# Patient Record
Sex: FEMALE | Race: WHITE | Employment: FULL TIME | URBAN - METROPOLITAN AREA
[De-identification: names, ages, dates, MRNs, and addresses within clinical notes are randomized per-mention and may not be internally consistent; named-entity substitution may affect disease eponyms.]

---

## 2017-03-17 ENCOUNTER — HOSPITAL ENCOUNTER (OUTPATIENT)
Dept: MAMMOGRAPHY | Age: 42
Discharge: HOME | End: 2017-03-17
Attending: OBSTETRICS & GYNECOLOGY
Payer: COMMERCIAL

## 2017-03-17 DIAGNOSIS — Z12.31 VISIT FOR SCREENING MAMMOGRAM: ICD-10-CM

## 2017-03-17 PROCEDURE — 77067 SCR MAMMO BI INCL CAD: CPT

## 2017-07-11 ENCOUNTER — OFFICE VISIT (OUTPATIENT)
Dept: INTERNAL MEDICINE CLINIC | Age: 42
End: 2017-07-11

## 2017-07-11 VITALS
WEIGHT: 136.2 LBS | RESPIRATION RATE: 18 BRPM | DIASTOLIC BLOOD PRESSURE: 80 MMHG | TEMPERATURE: 98 F | OXYGEN SATURATION: 98 % | HEIGHT: 62 IN | SYSTOLIC BLOOD PRESSURE: 100 MMHG | BODY MASS INDEX: 25.06 KG/M2 | HEART RATE: 100 BPM

## 2017-07-11 DIAGNOSIS — R14.0 ABDOMINAL BLOATING: ICD-10-CM

## 2017-07-11 DIAGNOSIS — R11.0 NAUSEA: Primary | ICD-10-CM

## 2017-07-11 RX ORDER — OMEPRAZOLE 20 MG/1
20 CAPSULE, DELAYED RELEASE ORAL DAILY
Qty: 21 CAP | Refills: 0
Start: 2017-07-11 | End: 2017-08-01

## 2017-07-11 NOTE — MR AVS SNAPSHOT
Visit Information Date & Time Provider Department Dept. Phone Encounter #  
 7/11/2017  3:00 PM Cindy Boone, [address] [facility] [phone] [id number] Follow-up Instructions Return in about 3 weeks (around 8/1/2017). Upcoming Health Maintenance Date Due INFLUENZA AGE 9 TO ADULT 8/1/2017 PAP AKA CERVICAL CYTOLOGY 1/1/2020 DTaP/Tdap/Td series (2 - Td) 4/18/2022 Allergies as of 7/11/2017  Review Complete On: 7/11/2017 By: Cindy Boone MD  
 No Known Allergies Current Immunizations  Reviewed on 5/8/2013 Name Date Influenza Vaccine 10/8/2012 Tdap 4/18/2012 Not reviewed this visit You Were Diagnosed With   
  
 Codes Comments Nausea    -  Primary ICD-10-CM: R11.0 ICD-9-CM: 787.02 Abdominal bloating     ICD-10-CM: R14.0 ICD-9-CM: 884. 3 Vitals BP Pulse Temp Resp Height(growth percentile) Weight(growth percentile) 100/80 (BP 1 Location: Right arm, BP Patient Position: Sitting) 100 98 °F (36.7 °C) (Oral) 18 5' 1.5\" (1.562 m) 136 lb 3.2 oz (61.8 kg) LMP SpO2 BMI OB Status Smoking Status 07/10/2017 (Exact Date) 98% 25.32 kg/m2 Having regular periods Never Smoker BMI and BSA Data Body Mass Index Body Surface Area  
 25.32 kg/m 2 1.64 m 2 Preferred Pharmacy Pharmacy Name Phone [facility]/PH[facility]AC[facility] #[id number] MARLENA Franks  [phone] Your Updated Medication List  
  
   
This list is accurate as of: 7/11/17  4:03 PM.  Always use your most recent med list.  
  
  
  
  
 levonorgestrel-ethinyl estradiol 0.15-0.03 mg Tab Commonly known as:  NORDETTE Take  by mouth daily. omeprazole 20 mg capsule Commonly known as:  PRILOSEC Take 1 Cap by mouth daily for 21 days. TOPAMAX 50 mg tablet Generic drug:  topiramate Take 175 mg by mouth two (2) times a day. We Performed the Following CBC WITH AUTOMATED DIFF [40854 CPT(R)] METABOLIC PANEL, COMPREHENSIVE [18918 CPT(R)] TSH 3RD GENERATION [48038 CPT(R)] URINALYSIS W/ RFLX MICROSCOPIC [76409 CPT(R)] Follow-up Instructions Return in about 3 weeks (around 8/1/2017). To-Do List   
 07/13/2017 Imaging:  US ABD COMP Introducing [facility] & HEALTH SERVICES! Dear [facility]: 
Thank you for requesting a [facility] account. Our records indicate that you already have an active [facility] account. You can access your account anytime at [email or web address] Did you know that you can access your hospital and ER discharge instructions at any time in [facility]? You can also review all of your test results from your hospital stay or ER visit. Additional Information If you have questions, please visit the Frequently Asked Questions section of the [facility] website at [email or web address] Remember, [facility] is NOT to be used for urgent needs. For medical emergencies, dial 911. Now available from your iPhone and Android! Please provide this summary of care documentation to your next provider. Your primary care clinician is listed as Margi Cornejo. If you have any questions after today's visit, please call [phone].

## 2017-07-12 LAB
ALBUMIN SERPL-MCNC: 4.3 G/DL (ref 3.5–5.5)
ALBUMIN/GLOB SERPL: 1.7 {RATIO} (ref 1.2–2.2)
ALP SERPL-CCNC: 55 IU/L (ref 39–117)
ALT SERPL-CCNC: 10 IU/L (ref 0–32)
APPEARANCE UR: ABNORMAL
AST SERPL-CCNC: 15 IU/L (ref 0–40)
BASOPHILS # BLD AUTO: 0 X10E3/UL (ref 0–0.2)
BASOPHILS NFR BLD AUTO: 0 %
BILIRUB SERPL-MCNC: 0.2 MG/DL (ref 0–1.2)
BILIRUB UR QL STRIP: NEGATIVE
BUN SERPL-MCNC: 11 MG/DL (ref 6–24)
BUN/CREAT SERPL: 15 (ref 9–23)
CALCIUM SERPL-MCNC: 9.1 MG/DL (ref 8.7–10.2)
CHLORIDE SERPL-SCNC: 103 MMOL/L (ref 96–106)
CO2 SERPL-SCNC: 23 MMOL/L (ref 18–29)
COLOR UR: YELLOW
CREAT SERPL-MCNC: 0.74 MG/DL (ref 0.57–1)
EOSINOPHIL # BLD AUTO: 0.1 X10E3/UL (ref 0–0.4)
EOSINOPHIL NFR BLD AUTO: 1 %
ERYTHROCYTE [DISTWIDTH] IN BLOOD BY AUTOMATED COUNT: 13.5 % (ref 12.3–15.4)
GLOBULIN SER CALC-MCNC: 2.5 G/DL (ref 1.5–4.5)
GLUCOSE SERPL-MCNC: 80 MG/DL (ref 65–99)
GLUCOSE UR QL: NEGATIVE
HCT VFR BLD AUTO: 40.5 % (ref 34–46.6)
HGB BLD-MCNC: 13.5 G/DL (ref 11.1–15.9)
HGB UR QL STRIP: NEGATIVE
IMM GRANULOCYTES # BLD: 0 X10E3/UL (ref 0–0.1)
IMM GRANULOCYTES NFR BLD: 0 %
KETONES UR QL STRIP: NEGATIVE
LEUKOCYTE ESTERASE UR QL STRIP: NEGATIVE
LYMPHOCYTES # BLD AUTO: 2.3 X10E3/UL (ref 0.7–3.1)
LYMPHOCYTES NFR BLD AUTO: 34 %
MCH RBC QN AUTO: 31.7 PG (ref 26.6–33)
MCHC RBC AUTO-ENTMCNC: 33.3 G/DL (ref 31.5–35.7)
MCV RBC AUTO: 95 FL (ref 79–97)
MICRO URNS: ABNORMAL
MONOCYTES # BLD AUTO: 0.7 X10E3/UL (ref 0.1–0.9)
MONOCYTES NFR BLD AUTO: 11 %
NEUTROPHILS # BLD AUTO: 3.6 X10E3/UL (ref 1.4–7)
NEUTROPHILS NFR BLD AUTO: 54 %
NITRITE UR QL STRIP: NEGATIVE
PH UR STRIP: 6.5 [PH] (ref 5–7.5)
PLATELET # BLD AUTO: 312 X10E3/UL (ref 150–379)
POTASSIUM SERPL-SCNC: 3.8 MMOL/L (ref 3.5–5.2)
PROT SERPL-MCNC: 6.8 G/DL (ref 6–8.5)
PROT UR QL STRIP: NEGATIVE
RBC # BLD AUTO: 4.26 X10E6/UL (ref 3.77–5.28)
SODIUM SERPL-SCNC: 140 MMOL/L (ref 134–144)
SP GR UR: 1.02 (ref 1–1.03)
TSH SERPL DL<=0.005 MIU/L-ACNC: 1.26 UIU/ML (ref 0.45–4.5)
UROBILINOGEN UR STRIP-MCNC: 0.2 MG/DL (ref 0.2–1)
WBC # BLD AUTO: 6.7 X10E3/UL (ref 3.4–10.8)

## 2017-07-13 ENCOUNTER — HOSPITAL ENCOUNTER (OUTPATIENT)
Dept: ULTRASOUND IMAGING | Age: 42
Discharge: HOME | End: 2017-07-13
Attending: INTERNAL MEDICINE
Payer: COMMERCIAL

## 2017-07-13 DIAGNOSIS — R14.0 ABDOMINAL BLOATING: ICD-10-CM

## 2017-07-13 DIAGNOSIS — R11.0 NAUSEA: ICD-10-CM

## 2017-07-13 PROCEDURE — 76700 US EXAM ABDOM COMPLETE: CPT

## 2017-07-13 NOTE — PROGRESS NOTES
HISTORY OF PRESENT ILLNESS  Elizabeth Lagunas is a 42 y.o. female. BRISA Deleon is seen today for evaluation of abdominal pain. This also represents a re-establishment visit as she has not required an office visit for several years. She describes having upper abdominal pain generally centered more around the left upper quadrant. She has associated nausea and a bloated feeling. This is ongoing for years. It is a low grade type symptom and has not required any ER visits or hospitalizations. She had always attributed it to IBS. She has never had a workup or GI consultation. For the last year or so, the symptoms have been increasing, no real change in pattern. She denies a change in her stools, nothing that would suggest melena or rectal bleeding. She has tried Tums and Prilosec for short courses (a few days) with mild relief. She also cut back on dairy products. She has noted an increase in symptoms when she eats popcorn, drinks a small amount of alcohol or eats ice cream.      Past medical history and past surgical history are unremarkable. She does have migraine headaches. Review of systems notable for having a fair amount of stress being the primary caretaker along with her sister for her mom who now is in assisted living. Her mother had a stroke with subsequent development of some dementia. Her father passed away with lung cancer. Despite this Saul Deleon denies any anxiety or depression symptoms. 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 MedDATA/[facility]      Past Medical History:   Diagnosis Date    Hx of mammogram 2013    Pap smear for cervical cancer screening 2012     History reviewed. No pertinent surgical history. Current Outpatient Prescriptions   Medication Sig    omeprazole (PRILOSEC) 20 mg capsule Take 1 Cap by mouth daily for 21 days.     levonorgestrel-ethinyl estradiol (NORDETTE) 0.15-30 mg-mcg per tablet Take  by mouth daily.  topiramate (TOPAMAX) 50 mg tablet Take 175 mg by mouth two (2) times a day. No current facility-administered medications for this visit. No Known Allergies     Family History   Problem Relation Age of Onset    Hypertension Mother    [facility] Stroke Mother     Dementia Mother     Psychiatric Disorder Sister      multiple personalities    SLE Sister     Cancer Brother      skin cancer   see hpi for updates  Social History     Social History    Marital status:      Spouse name: Wilian Bernal    Number of children: 2    Years of education: N/A     Occupational History    RN nurse manager [employer]     Social History Main Topics    Smoking status: Never Smoker    Smokeless tobacco: Never Used    Alcohol use Yes      Comment: socially    Drug use: No    Sexual activity: Yes     Other Topics Concern    Not on file     Social History Narrative           Review of Systems   Constitutional: Negative for chills, fever, malaise/fatigue and weight loss. Gastrointestinal: Positive for abdominal pain and nausea. Negative for blood in stool, diarrhea, heartburn, melena and vomiting. Genitourinary: Negative. Musculoskeletal: Negative. Neurological: Positive for headaches. Psychiatric/Behavioral: Negative for depression and suicidal ideas. All other systems reviewed and are negative. Physical Exam   Constitutional: She appears well-nourished. No distress. HENT:   Head: Normocephalic and atraumatic. Eyes: Conjunctivae are normal. Pupils are equal, round, and reactive to light. Right eye exhibits no discharge. Left eye exhibits no discharge. No scleral icterus. Neck: Normal range of motion. Neck supple. Carotid bruit is not present. No thyromegaly present. Cardiovascular: Normal rate and regular rhythm. Exam reveals no gallop and no friction rub. No murmur heard. Pulmonary/Chest: Effort normal and breath sounds normal. No respiratory distress. Abdominal: Soft. Bowel sounds are normal. She exhibits no distension and no mass. There is no tenderness. There is no rebound and no guarding. Musculoskeletal: She exhibits no edema. Lymphadenopathy:     She has no cervical adenopathy. Neurological: She is alert. Skin: Skin is warm and dry. Psychiatric: She has a normal mood and affect. Her behavior is normal.   Nursing note and vitals reviewed. ASSESSMENT and Cedric Anne was seen today for establish care, abdominal pain, nausea and bloated. Diagnoses and all orders for this visit:    Nausea  -     METABOLIC PANEL, COMPREHENSIVE  -     CBC WITH AUTOMATED DIFF  -     TSH 3RD GENERATION  -     URINALYSIS W/ RFLX MICROSCOPIC  -     US ABD COMP; Future  -     omeprazole (PRILOSEC) 20 mg capsule; Take 1 Cap by mouth daily for 21 days. Abdominal bloating  -     METABOLIC PANEL, COMPREHENSIVE  -     CBC WITH AUTOMATED DIFF  -     TSH 3RD GENERATION  -     URINALYSIS W/ RFLX MICROSCOPIC  -     US ABD COMP; Future  -     omeprazole (PRILOSEC) 20 mg capsule; Take 1 Cap by mouth daily for 21 days.

## 2017-07-14 ENCOUNTER — TELEPHONE (OUTPATIENT)
Dept: INTERNAL MEDICINE CLINIC | Age: 42
End: 2017-07-14

## 2017-07-14 DIAGNOSIS — K82.4 GALLBLADDER POLYP: Primary | ICD-10-CM

## 2017-07-14 DIAGNOSIS — Q45.3 PANCREATIC ABNORMALITY: ICD-10-CM

## 2017-07-14 NOTE — TELEPHONE ENCOUNTER
Advised pt she is scheduled at [address] [place] [address] for CT ABD/Pelvis on Monday 7/17/17 at 10:30 am. Pt is aware to be NPO 4 hrs prior, of appt location and to arrive by 10 am.  She will  contrast today to drink at home prior to test.

## 2017-07-17 ENCOUNTER — HOSPITAL ENCOUNTER (OUTPATIENT)
Dept: CT IMAGING | Age: 42
Discharge: HOME | End: 2017-07-17
Attending: INTERNAL MEDICINE
Payer: COMMERCIAL

## 2017-07-17 DIAGNOSIS — Q45.3 PANCREATIC ABNORMALITY: ICD-10-CM

## 2017-07-17 DIAGNOSIS — K82.4 GALLBLADDER POLYP: ICD-10-CM

## 2017-07-17 PROCEDURE — 74170 CT ABD WO CNTRST FLWD CNTRST: CPT

## 2017-07-17 PROCEDURE — 72193 CT PELVIS W/DYE: CPT

## 2017-07-17 PROCEDURE — 74011000258 HC RX REV CODE- 258: Performed by: INTERNAL MEDICINE

## 2017-07-17 PROCEDURE — 74011636320 HC RX REV CODE- 636/320: Performed by: INTERNAL MEDICINE

## 2017-07-17 RX ORDER — SODIUM CHLORIDE 0.9 % (FLUSH) 0.9 %
10 SYRINGE (ML) INJECTION
Status: COMPLETED | OUTPATIENT
Start: 2017-07-17 | End: 2017-07-17

## 2017-07-17 RX ADMIN — IOPAMIDOL 100 ML: 755 INJECTION, SOLUTION INTRAVENOUS at 11:00

## 2017-07-17 RX ADMIN — SODIUM CHLORIDE 100 ML: 900 INJECTION, SOLUTION INTRAVENOUS at 11:00

## 2017-07-17 RX ADMIN — Medication 10 ML: at 11:00

## 2017-08-13 ENCOUNTER — HOSPITAL ENCOUNTER (EMERGENCY)
Age: 42
Discharge: HOME | End: 2017-08-13
Attending: FAMILY MEDICINE

## 2017-08-13 VITALS
BODY MASS INDEX: 24.84 KG/M2 | OXYGEN SATURATION: 96 % | HEART RATE: 80 BPM | DIASTOLIC BLOOD PRESSURE: 60 MMHG | HEIGHT: 62 IN | WEIGHT: 135 LBS | SYSTOLIC BLOOD PRESSURE: 117 MMHG | RESPIRATION RATE: 16 BRPM | TEMPERATURE: 98 F

## 2017-08-13 DIAGNOSIS — S05.00XA CORNEAL ABRASION, UNSPECIFIED LATERALITY, INITIAL ENCOUNTER: Primary | ICD-10-CM

## 2017-08-13 RX ORDER — ERYTHROMYCIN 5 MG/G
OINTMENT OPHTHALMIC
Qty: 3.5 G | Refills: 0 | Status: SHIPPED | OUTPATIENT
Start: 2017-08-13 | End: 2017-08-20

## 2017-08-13 NOTE — UC PROVIDER NOTE
Patient is a 36 y.o. female presenting with eye itching. The history is provided by the patient. Itchy Eye    This is a new problem. The current episode started 6 to 12 hours ago. The problem occurs constantly. The problem has not changed since onset. Both eyes are affected. The injury mechanism was none. The pain is at a severity of 5/10. There is no history of trauma to the eye. There is no known exposure to pink eye. She wears contacts. Associated symptoms include foreign body sensation, photophobia, eye redness and pain. Pertinent negatives include no numbness, no blurred vision, no decreased vision, no discharge, no double vision, no nausea, no vomiting, no tingling, no itching and no dizziness. She has tried nothing for the symptoms. Past Medical History:   Diagnosis Date    Hx of mammogram 2013    Pap smear for cervical cancer screening 2012        History reviewed. No pertinent surgical history. Family History   Problem Relation Age of Onset    Hypertension Mother     Stroke Mother     Dementia Mother     Cancer Father      lung    Psychiatric Disorder Sister      multiple personalities    SLE Sister     Cancer Brother      skin cancer        Social History     Social History    Marital status:      Spouse name: Chris Butt    Number of children: 2    Years of education: N/A     Occupational History    RN nurse manager [employer]     Social History Main Topics    Smoking status: Never Smoker    Smokeless tobacco: Never Used    Alcohol use Yes      Comment: socially    Drug use: No    Sexual activity: Yes     Other Topics Concern    Not on file     Social History Narrative                ALLERGIES: Review of patient's allergies indicates no known allergies. Review of Systems   Eyes: Positive for photophobia, pain, redness and itching. Negative for blurred vision, double vision and discharge. Gastrointestinal: Negative for nausea and vomiting. Skin: Negative for itching. Neurological: Negative for dizziness, tingling and numbness. Vitals:    08/13/17 1401   BP: 117/60   Pulse: 80   Resp: 16   Temp: 98 °F (36.7 °C)   SpO2: 96%   Weight: 61.2 kg (135 lb)   Height: 5' 2\" (1.575 m)       Physical Exam   Constitutional: She is oriented to person, place, and time. She appears well-developed and well-nourished. Eyes: Conjunctivae and EOM are normal.   Abrasion at 6:00 position of left eye   Pulmonary/Chest: Effort normal.   Neurological: She is alert and oriented to person, place, and time. Skin: Skin is warm and dry. Psychiatric: She has a normal mood and affect. Her behavior is normal. Thought content normal.       MDM     Differential Diagnosis; Clinical Impression; Plan:     CLINICAL IMPRESSION:  Corneal abrasion, unspecified laterality, initial encounter  (primary encounter diagnosis)    Plan:  1. Erythromycin ophthalmic  2.   3.   Risk of Significant Complications, Morbidity, and/or Mortality:   Presenting problems: Moderate  Diagnostic procedures: Moderate  Management options:   Moderate  Progress:   Patient progress:  Stable      Procedures

## 2017-08-13 NOTE — DISCHARGE INSTRUCTIONS
Corneal Scratches: Care Instructions  Your Care Instructions    The cornea is the clear surface that covers the front of the eye. When a speck of dirt, a wood chip, an insect, or another object flies into your eye, it can cause a painful scratch on the cornea. Wearing contact lenses too long or rubbing your eyes can also scratch the cornea. Small scratches usually heal in a day or two. Deeper scratches may take longer. If you have had a foreign object removed from your eye or you have a corneal scratch, you will need to watch for infection and vision problems while your eye heals. Follow-up care is a key part of your treatment and safety. Be sure to make and go to all appointments, and call your doctor if you are having problems. It's also a good idea to know your test results and keep a list of the medicines you take. How can you care for yourself at home? · The doctor probably used a medicine during your exam to numb your eye. When it wears off in 30 to 60 minutes, your eye pain may come back. Take pain medicines exactly as directed. ¨ If the doctor gave you a prescription medicine for pain, take it as prescribed. ¨ If you are not taking a prescription pain medicine, ask your doctor if you can take an over-the-counter medicine. ¨ Do not take two or more pain medicines at the same time unless the doctor told you to. Many pain medicines have acetaminophen, which is Tylenol. Too much acetaminophen (Tylenol) can be harmful. · Do not rub your injured eye. Rubbing can make it worse. · Use the prescribed eyedrops or ointment as directed. Be sure the dropper or bottle tip is clean. To put in eyedrops or ointment:  ¨ Tilt your head back, and pull your lower eyelid down with one finger. ¨ Drop or squirt the medicine inside the lower lid. ¨ Close your eye for 30 to 60 seconds to let the drops or ointment move around. ¨ Do not touch the ointment or dropper tip to your eyelashes or any other surface.   · Do not use your contact lens in your hurt eye until your doctor says you can. Also, do not wear eye makeup until your eye has healed. · Do not drive if you have blurred vision. · Bright light may hurt. Sunglasses can help. · To prevent eye injuries in the future, wear safety glasses or goggles when you work with machines or tools, mow the lawn, or ride a bike or motorcycle. When should you call for help? Call your doctor now or seek immediate medical care if:  · You have signs of an eye infection, such as:  ¨ Pus or thick discharge coming from the eye. ¨ Redness or swelling around the eye. ¨ A fever. · You have new or worse eye pain. · You have vision changes. · It feels like there is something in your eye. · Light hurts your eye. Watch closely for changes in your health, and be sure to contact your doctor if:  · You do not get better as expected. Where can you learn more? Go to http://caitlin-mita.info/. Enter H428 in the search box to learn more about \"Corneal Scratches: Care Instructions. \"  Current as of: March 20, 2017  Content Version: 11.3  © 5530-8664 [employer]. Care instructions adapted under license by [employer] (which disclaims liability or warranty for this information). If you have questions about a medical condition or this instruction, always ask your healthcare professional. [place] [address] any warranty or liability for your use of this information.

## 2017-08-13 NOTE — LETTER
[facility] 
[address] [place] [address] 
[phone] Work/School Note Date: 8/13/2017 To Whom It May concern: Indy Elliott was seen and treated today in the emergency room by the following provider(s): 
Attending Provider: Blayne Boyce MD 
Physician Assistant: Angel Luis Randall. Indy Elliott may return to work on 08/16/17. Sincerely, Angel Luis Randall

## 2017-08-24 ENCOUNTER — OFFICE VISIT (OUTPATIENT)
Dept: INTERNAL MEDICINE CLINIC | Age: 42
End: 2017-08-24

## 2017-08-24 VITALS
TEMPERATURE: 98.4 F | OXYGEN SATURATION: 99 % | BODY MASS INDEX: 25.17 KG/M2 | HEART RATE: 83 BPM | DIASTOLIC BLOOD PRESSURE: 70 MMHG | RESPIRATION RATE: 16 BRPM | HEIGHT: 62 IN | WEIGHT: 136.8 LBS | SYSTOLIC BLOOD PRESSURE: 100 MMHG

## 2017-08-24 DIAGNOSIS — R14.0 ABDOMINAL BLOATING: Primary | ICD-10-CM

## 2017-08-24 NOTE — MR AVS SNAPSHOT
Visit Information Date & Time Provider Department Dept. Phone Encounter #  
 8/24/2017  4:35 PM Song Schrader, [facility] Medicine [phone] [id number] Follow-up Instructions Return if symptoms worsen or fail to improve. Upcoming Health Maintenance Date Due INFLUENZA AGE 9 TO ADULT 8/1/2017 PAP AKA CERVICAL CYTOLOGY 1/1/2020 DTaP/Tdap/Td series (2 - Td) 4/18/2022 Allergies as of 8/24/2017  Review Complete On: 8/24/2017 By: Song Schrader MD  
 No Known Allergies Current Immunizations  Reviewed on 5/8/2013 Name Date Influenza Vaccine 10/8/2012 Tdap 4/18/2012 Not reviewed this visit You Were Diagnosed With   
  
 Codes Comments Abdominal bloating    -  Primary ICD-10-CM: R14.0 ICD-9-CM: 928. 3 Vitals BP Pulse Temp Resp Height(growth percentile) Weight(growth percentile) 100/70 (BP 1 Location: Right arm, BP Patient Position: Sitting) 83 98.4 °F (36.9 °C) (Oral) 16 5' 2\" (1.575 m) 136 lb 12.8 oz (62.1 kg) LMP SpO2 BMI OB Status Smoking Status 08/06/2017 99% 25.02 kg/m2 Having regular periods Never Smoker BMI and BSA Data Body Mass Index Body Surface Area 25.02 kg/m 2 1.65 m 2 Preferred Pharmacy Pharmacy Name Phone [facility]/PHARMACY #[id number] MARLENA Martinez  Laverne BULLARD/ Leonel Zepeda [facility] [phone] Your Updated Medication List  
  
   
This list is accurate as of: 8/24/17  5:26 PM.  Always use your most recent med list.  
  
  
  
  
 levonorgestrel-ethinyl estradiol 0.15-0.03 mg Tab Commonly known as:  NORDETTE Take  by mouth daily. TOPAMAX 50 mg tablet Generic drug:  topiramate Take 175 mg by mouth two (2) times a day. We Performed the Following REFERRAL TO GASTROENTEROLOGY [RDY04 Custom] Follow-up Instructions Return if symptoms worsen or fail to improve. Referral Information Referral ID Referred By Referred To  
  
 [id number] MI[place], [address] [facility] Edna Dan MD   
   [address] Phone: [phone] Fax: [phone] Visits Status Start Date End Date 1 New Request 8/24/17 8/24/18 If your referral has a status of pending review or denied, additional information will be sent to support the outcome of this decision. Introducing [facility] & HEALTH SERVICES! Dear [facility]: 
Thank you for requesting a [facility] account. Our records indicate that you already have an active [facility] account. You can access your account anytime at [email or web address] Did you know that you can access your hospital and ER discharge instructions at any time in [facility]? You can also review all of your test results from your hospital stay or ER visit. Additional Information If you have questions, please visit the Frequently Asked Questions section of the [employer] website at [email or web address]. Remember, [facility] is NOT to be used for urgent needs. For medical emergencies, dial 911. Now available from your iPhone and Android! Please provide this summary of care documentation to your next provider. Your primary care clinician is listed as GEOVANNY KENDRICK. If you have any questions after today's visit, please call [phone].

## 2017-08-25 NOTE — PROGRESS NOTES
HISTORY OF PRESENT ILLNESS  Sheldon Price is a 36 y.o. female. HPI Hiren Fuentes is seen today for follow up of abdominal pain with associated bloating. She has had mild improvement with Prilosec. She does admit to being inconsistent with use of the medication. Her workup has revealed gallbladder polyps only. Otherwise, workup is negative. So far reassured by the unremarkable workup, but I would like to refer to GI for further evaluation and guidance for long term therapy. She agrees. Names are provided and she will schedule the consultation. MedDATA/gwo         Review of Systems   Constitutional: Negative for chills, fever and weight loss. Gastrointestinal: Positive for abdominal pain. Physical Exam   Constitutional: She appears well-nourished. No distress. Skin: Skin is warm and dry. Nursing note and vitals reviewed. ASSESSMENT and PLAN  Diagnoses and all orders for this visit:    1.  Abdominal bloating  -     REFERRAL TO GASTROENTEROLOGY

## 2018-02-27 ENCOUNTER — HOSPITAL ENCOUNTER (OUTPATIENT)
Dept: MAMMOGRAPHY | Age: 43
Discharge: HOME | End: 2018-02-27
Attending: OBSTETRICS & GYNECOLOGY
Payer: COMMERCIAL

## 2018-02-27 DIAGNOSIS — R92.8 ABNORMAL MAMMOGRAM: ICD-10-CM

## 2018-02-27 DIAGNOSIS — N63.0 BREAST LUMP: ICD-10-CM

## 2018-02-27 PROCEDURE — 76642 ULTRASOUND BREAST LIMITED: CPT

## 2018-02-27 PROCEDURE — 77066 DX MAMMO INCL CAD BI: CPT

## 2018-05-02 ENCOUNTER — HOSPITAL ENCOUNTER (OUTPATIENT)
Age: 43
Setting detail: OUTPATIENT SURGERY
End: 2018-05-02
Attending: UROLOGY | Admitting: UROLOGY

## 2018-05-04 ENCOUNTER — HOSPITAL ENCOUNTER (OUTPATIENT)
Dept: PREADMISSION TESTING | Age: 43
Discharge: HOME | End: 2018-05-04
Payer: COMMERCIAL

## 2018-05-04 VITALS
SYSTOLIC BLOOD PRESSURE: 109 MMHG | TEMPERATURE: 98.1 F | HEART RATE: 63 BPM | HEIGHT: 62 IN | WEIGHT: 132 LBS | BODY MASS INDEX: 24.29 KG/M2 | DIASTOLIC BLOOD PRESSURE: 71 MMHG

## 2018-05-04 LAB
ALBUMIN SERPL-MCNC: 3.8 G/DL (ref 3.5–5)
ALBUMIN/GLOB SERPL: 1.3 {RATIO} (ref 1.1–2.2)
ALP SERPL-CCNC: 56 U/L (ref 45–117)
ALT SERPL-CCNC: 19 U/L (ref 12–78)
ANION GAP SERPL CALC-SCNC: 7 MMOL/L (ref 5–15)
AST SERPL-CCNC: 13 U/L (ref 15–37)
BASOPHILS # BLD: 0 K/UL (ref 0–0.1)
BASOPHILS NFR BLD: 1 % (ref 0–1)
BILIRUB SERPL-MCNC: 0.2 MG/DL (ref 0.2–1)
BUN SERPL-MCNC: 11 MG/DL (ref 6–20)
BUN/CREAT SERPL: 15 (ref 12–20)
CALCIUM SERPL-MCNC: 9.1 MG/DL (ref 8.5–10.1)
CHLORIDE SERPL-SCNC: 110 MMOL/L (ref 97–108)
CO2 SERPL-SCNC: 25 MMOL/L (ref 21–32)
CREAT SERPL-MCNC: 0.73 MG/DL (ref 0.55–1.02)
DIFFERENTIAL METHOD BLD: NORMAL
EOSINOPHIL # BLD: 0.1 K/UL (ref 0–0.4)
EOSINOPHIL NFR BLD: 2 % (ref 0–7)
ERYTHROCYTE [DISTWIDTH] IN BLOOD BY AUTOMATED COUNT: 13.1 % (ref 11.5–14.5)
GLOBULIN SER CALC-MCNC: 2.9 G/DL (ref 2–4)
GLUCOSE SERPL-MCNC: 87 MG/DL (ref 65–100)
HCT VFR BLD AUTO: 41.1 % (ref 35–47)
HGB BLD-MCNC: 13.7 G/DL (ref 11.5–16)
IMM GRANULOCYTES # BLD: 0 K/UL (ref 0–0.04)
IMM GRANULOCYTES NFR BLD AUTO: 0 % (ref 0–0.5)
LYMPHOCYTES # BLD: 1.5 K/UL (ref 0.8–3.5)
LYMPHOCYTES NFR BLD: 25 % (ref 12–49)
MCH RBC QN AUTO: 32.8 PG (ref 26–34)
MCHC RBC AUTO-ENTMCNC: 33.3 G/DL (ref 30–36.5)
MCV RBC AUTO: 98.3 FL (ref 80–99)
MONOCYTES # BLD: 0.5 K/UL (ref 0–1)
MONOCYTES NFR BLD: 8 % (ref 5–13)
NEUTS SEG # BLD: 3.9 K/UL (ref 1.8–8)
NEUTS SEG NFR BLD: 64 % (ref 32–75)
NRBC # BLD: 0 K/UL (ref 0–0.01)
NRBC BLD-RTO: 0 PER 100 WBC
PLATELET # BLD AUTO: 219 K/UL (ref 150–400)
PMV BLD AUTO: 10.7 FL (ref 8.9–12.9)
POTASSIUM SERPL-SCNC: 4.2 MMOL/L (ref 3.5–5.1)
PROT SERPL-MCNC: 6.7 G/DL (ref 6.4–8.2)
RBC # BLD AUTO: 4.18 M/UL (ref 3.8–5.2)
SODIUM SERPL-SCNC: 142 MMOL/L (ref 136–145)
WBC # BLD AUTO: 6 K/UL (ref 3.6–11)

## 2018-05-04 PROCEDURE — 80053 COMPREHEN METABOLIC PANEL: CPT | Performed by: UROLOGY

## 2018-05-04 PROCEDURE — 36415 COLL VENOUS BLD VENIPUNCTURE: CPT | Performed by: UROLOGY

## 2018-05-04 PROCEDURE — 85025 COMPLETE CBC W/AUTO DIFF WBC: CPT | Performed by: UROLOGY

## 2018-12-21 ENCOUNTER — HOSPITAL ENCOUNTER (OUTPATIENT)
Dept: ULTRASOUND IMAGING | Age: 43
Discharge: HOME | End: 2018-12-21
Attending: INTERNAL MEDICINE
Payer: COMMERCIAL

## 2018-12-21 DIAGNOSIS — R14.0 ABDOMINAL DISTENSION: ICD-10-CM

## 2018-12-21 DIAGNOSIS — K82.4 POLYP OF GALLBLADDER: ICD-10-CM

## 2018-12-21 DIAGNOSIS — R93.5 ABNORMAL CT OF THE ABDOMEN: ICD-10-CM

## 2018-12-21 DIAGNOSIS — R14.3 EXCESSIVE FLATUS: ICD-10-CM

## 2018-12-21 PROCEDURE — 76700 US EXAM ABDOM COMPLETE: CPT

## 2019-02-22 ENCOUNTER — HOSPITAL ENCOUNTER (OUTPATIENT)
Dept: MRI IMAGING | Age: 44
Discharge: HOME | End: 2019-02-22
Attending: INTERNAL MEDICINE
Payer: COMMERCIAL

## 2019-02-22 DIAGNOSIS — R14.0 ABDOMINAL DISTENSION (GASEOUS): ICD-10-CM

## 2019-02-22 DIAGNOSIS — R14.3 EXCESSIVE FLATUS: ICD-10-CM

## 2019-02-22 PROCEDURE — 74011250636 HC RX REV CODE- 250/636: Performed by: INTERNAL MEDICINE

## 2019-02-22 PROCEDURE — 74183 MRI ABD W/O CNTR FLWD CNTR: CPT

## 2019-02-22 PROCEDURE — A9585 GADOBUTROL INJECTION: HCPCS | Performed by: INTERNAL MEDICINE

## 2019-02-22 RX ADMIN — GADOBUTROL 7 ML: 604.72 INJECTION INTRAVENOUS at 09:04

## 2020-05-08 ENCOUNTER — TELEPHONE (OUTPATIENT)
Dept: INTERNAL MEDICINE CLINIC | Age: 45
End: 2020-05-08

## 2020-05-08 NOTE — TELEPHONE ENCOUNTER
Condolences offered at [address] passing- she and family are doing ok. Will let us know if we can help.

## 2023-03-30 ENCOUNTER — NURSE TRIAGE (OUTPATIENT)
Dept: OTHER | Facility: CLINIC | Age: 48
End: 2023-03-30

## 2023-03-30 NOTE — TELEPHONE ENCOUNTER
[place]    Received call from [facility] at [facility] with [employer] Complaint; Patient with Red Flag Complaint requesting to establish care with [facility]. Subjective: Caller states \"Right now I am ok. Over the last month I have had on and off left sided pain in my chest.\"     Current Symptoms: Intermittent left sided chest pain, shortness of breath, with heart palpitations  - episodes last 20 minutes    No pain or shortness of breath at present    Speaking in complete sentences, speech is clear    Onset: 1 month ago;     Pain Severity: 5/10; Temperature: denies     Denies - diaphoresis / jaw pain / arm pain    What has been tried: nothing      Recommended disposition: Go to ED/UCC Now (Or to Office with PCP Approval)  Patient does not have a PCP and was advised to go to an ED for current problem. Care advice provided, patient verbalizes understanding; denies any other questions or concerns; instructed to call back for any new or worsening symptoms. Patient/Caller agrees with recommended disposition; writer provided warm transfer to Ramirez at [facility] for appointment scheduling  For new patient appointment scheduling. Attention Provider: Thank you for allowing me to participate in the care of your patient. The patient was connected to triage in response to information provided to the ECC. Please do not respond through this encounter as the response is not directed to a shared pool.       Reason for Disposition   Chest pain lasting longer than 5 minutes and occurred in last 3 days (72 hours) (Exception: feels exactly the same as previously diagnosed heartburn and has accompanying sour taste in mouth)    Protocols used: Chest Pain-ADULT-[place]

## 2023-03-31 ENCOUNTER — OFFICE VISIT (OUTPATIENT)
Dept: URGENT CARE | Age: 48
End: 2023-03-31

## 2023-03-31 VITALS
SYSTOLIC BLOOD PRESSURE: 118 MMHG | WEIGHT: 144 LBS | DIASTOLIC BLOOD PRESSURE: 81 MMHG | RESPIRATION RATE: 16 BRPM | OXYGEN SATURATION: 99 % | HEIGHT: 62 IN | HEART RATE: 82 BPM | TEMPERATURE: 98.9 F | BODY MASS INDEX: 26.5 KG/M2

## 2023-03-31 DIAGNOSIS — R07.89 OTHER CHEST PAIN: Primary | ICD-10-CM

## 2023-03-31 DIAGNOSIS — R06.02 SHORTNESS OF BREATH: ICD-10-CM

## 2023-03-31 LAB
ALBUMIN SERPL-MCNC: 3.8 G/DL
ALKALINE PHOS POC: 80
ALT SERPL-CCNC: 17 U/L (ref 7–35)
AST SERPL-CCNC: 24 U/L (ref 13–35)
BASOPHILS # BLD: 0 K/UL (ref 0–0.1)
BASOPHILS NFR BLD: 1 % (ref 0–1)
BUN BLD-MCNC: 12 MG/DL
CALCIUM BLD-MCNC: 9.5 MG/DL
CHLORIDE BLD-SCNC: 109 MMOL/L
CO2 POC: 22 MEQ/L
CREAT BLD-MCNC: 0.7 MG/DL
DIFFERENTIAL METHOD BLD: NORMAL
EGFR (POC): NORMAL
EOSINOPHIL # BLD: 0.1 K/UL (ref 0–0.4)
EOSINOPHIL NFR BLD: 2 % (ref 0–7)
ERYTHROCYTE [DISTWIDTH] IN BLOOD BY AUTOMATED COUNT: 12.7 % (ref 11.5–14.5)
GLUCOSE POC: 96 MG/DL
HCT VFR BLD AUTO: 43.2 % (ref 35–47)
HGB BLD-MCNC: 14.5 G/DL (ref 11.5–16)
IMM GRANULOCYTES # BLD AUTO: 0 K/UL (ref 0–0.04)
IMM GRANULOCYTES NFR BLD AUTO: 0 % (ref 0–0.5)
LYMPHOCYTES # BLD: 1.8 K/UL (ref 0.8–3.5)
LYMPHOCYTES NFR BLD: 31 % (ref 12–49)
MCH RBC QN AUTO: 32 PG (ref 26–34)
MCHC RBC AUTO-ENTMCNC: 33.6 G/DL (ref 30–36.5)
MCV RBC AUTO: 95.4 FL (ref 80–99)
MONOCYTES # BLD: 0.5 K/UL (ref 0–1)
MONOCYTES NFR BLD: 9 % (ref 5–13)
NEUTS SEG # BLD: 3.4 K/UL (ref 1.8–8)
NEUTS SEG NFR BLD: 57 % (ref 32–75)
NRBC # BLD: 0 K/UL (ref 0–0.01)
NRBC BLD-RTO: 0 PER 100 WBC
PLATELET # BLD AUTO: 296 K/UL (ref 150–400)
PMV BLD AUTO: 10.3 FL (ref 8.9–12.9)
POTASSIUM SERPL-SCNC: 3.8 MMOL/L
PROT SERPL-MCNC: 7.2 G/DL
RBC # BLD AUTO: 4.53 M/UL (ref 3.8–5.2)
SODIUM SERPL-SCNC: 141 MMOL/L
TOTAL BILIRUBIN POC: 0.5 MG/DL
WBC # BLD AUTO: 5.9 K/UL (ref 3.6–11)

## 2023-03-31 NOTE — PROGRESS NOTES
The history is provided by the patient. Chest Pain (Angina)   The history is provided by the Patient. This is a recurrent problem. The current episode started more than 1 week ago (1 month). Duration of episode(s) is 2 hours. The problem occurs every several days. The pain is associated with breathing and normal activity. The pain is present in the left side and substernal region. The quality of the pain is described as pleuritic, pressure-like and intermittent. The pain does not radiate. The symptoms are aggravated by deep breathing, movement and certain positions. Associated symptoms include palpitations and shortness of breath. Pertinent negatives include no abdominal pain, no dizziness, no headaches, no numbness and no weakness. She has tried nothing for the symptoms. Risk factors include oral contraceptives. Her past medical history does not include cancer, DVT or PE. Pertinent negatives include no echocardiogram.     Past Medical History:   Diagnosis Date    Chronic pain     Hx of mammogram 2013    Migraine     Pap smear for cervical cancer screening 2012        History reviewed. No pertinent surgical history.       Family History   Problem Relation Age of Onset    Hypertension Mother     Stroke Mother     Dementia Mother     Cancer Father         lung    Psychiatric Disorder Sister         multiple personalities    Lupus Sister     SLE Sister     Cancer Brother         SCCA        Social History     Socioeconomic History    Marital status:      Spouse name: Addoway    Number of children: 2    Years of education: Not on file    Highest education level: Not on file   Occupational History    Occupation: RN nurse manager [employer]     Employer: [employer]   Tobacco Use    Smoking status: Never    Smokeless tobacco: Never   Substance and Sexual Activity    Alcohol use: Yes     Comment: socially    Drug use: No    Sexual activity: Yes   Other Topics Concern    Not on file   Social History Narrative    Not on file     Social Determinants of Health     Financial Resource Strain: Not on file   Food Insecurity: Not on file   Transportation Needs: Not on file   Physical Activity: Not on file   Stress: Not on file   Social Connections: Not on file   Intimate Partner Violence: Not on file   Housing Stability: Not on file                ALLERGIES: Patient has no known allergies. Review of Systems   Constitutional:  Negative for activity change and fatigue. Respiratory:  Positive for shortness of breath. Negative for chest tightness and wheezing. Cardiovascular:  Positive for chest pain and palpitations. Negative for leg swelling. Gastrointestinal:  Positive for abdominal distention (bloating). Negative for abdominal pain. Neurological:  Positive for light-headedness. Negative for dizziness, syncope, weakness, numbness and headaches. Vitals:    03/31/23 1222   BP: 118/81   Pulse: 82   Resp: 16   Temp: 98.9 °F (37.2 °C)   SpO2: 99%   Weight: 144 lb (65.3 kg)   Height: 5' 2\" (1.575 m)       Physical Exam  Constitutional:       Appearance: Normal appearance. Eyes:      Pupils: Pupils are equal, round, and reactive to light. Cardiovascular:      Rate and Rhythm: Normal rate. Heart sounds: S1 normal and S2 normal.     Friction rub (post diastolic) present. No gallop. Pulmonary:      Effort: Pulmonary effort is normal. No respiratory distress. Breath sounds: Normal breath sounds. No wheezing, rhonchi or rales. Chest:      Chest wall: No deformity or tenderness. Abdominal:      General: Bowel sounds are normal.      Palpations: Abdomen is soft. Musculoskeletal:         General: Normal range of motion. Cervical back: Normal range of motion. Right lower leg: No edema. Left lower leg: No edema. Skin:     General: Skin is warm. Capillary Refill: Capillary refill takes less than 2 seconds. Neurological:      General: No focal deficit present.       Mental Status: She is alert. Sensory: Sensory deficit present. Psychiatric:         Mood and Affect: Mood normal.         Behavior: Behavior normal.       MDM     Differential Diagnosis; Clinical Impression; Plan:     (R07.89) Other chest pain  (primary encounter diagnosis)  (R06.02) Shortness of breath    Ordered chest x-ray, stat D-dimer and CBC and POC BMP    Rule out PE, MVP, MVS  Amount and/or Complexity of Data Reviewed:   Tests in the radiology section of CPT®:  Ordered    EKG      Date/Time: 3/31/2023 12:59 PM  Performed by: Anuradha Schneider NP  Authorized by: Anuradha Schneider NP   Comparison: not compared with previous ECG   Rhythm: sinus rhythm  Rate: normal  QRS axis: normal  Conduction: incomplete RBBB  ST Segments: ST segments normal  Clinical impression: non-specific ECG  Clinical impression comment: borderline      1. Other chest pain  Recommend follow up with cardiology, consider need for echocardiogram   F/U: with ER if symptoms are worsening symptoms    - EKG, 12 LEAD, INITIAL  - XR CHEST PA LAT; Future  - D DIMER; Future  - CBC WITH AUTOMATED DIFF; Future  - AMB POC BASIC METABOLIC PANEL  - CBC WITH AUTOMATED DIFF  - D DIMER    2. Shortness of breath  See above    - XR CHEST PA LAT; Future  - D DIMER; Future  - CBC WITH AUTOMATED DIFF;  Future  - AMB POC BASIC METABOLIC PANEL  - CBC WITH AUTOMATED DIFF  - D DIMER         Results for orders placed or performed in visit on 03/31/23   AMB POC COMPREHENSIVE METABOLIC PANEL   Result Value Ref Range    GLUCOSE 96 mg/dL    BUN 12 mg/dL    Creatinine (POC) 0.7 mg/dL    Sodium (POC) 141 MMOL/L    Potassium (POC) 3.8 MMOL/L    CHLORIDE 109 MMOL/L    CO2 22 mEq/L    CALCIUM 9.5 mg/dL    TOTAL PROTEIN 7.2 g/dL    ALBUMIN 3.8     AST (POC) 24 13 - 35 U/L    ALT (POC) 17 7 - 35 U/L    ALKALINE PHOS 80     TOTAL BILIRUBIN 0.5 mg/dL    eGFR (POC)

## 2023-03-31 NOTE — PATIENT INSTRUCTIONS
1. Other chest pain  Recommend follow up with cardiology, consider need for echocardiogram   F/U: with ER if symptoms are worsening symptoms    - EKG, 12 LEAD, INITIAL  - XR CHEST PA LAT; Future  - D DIMER; Future  - CBC WITH AUTOMATED DIFF; Future  - AMB POC BASIC METABOLIC PANEL  - CBC WITH AUTOMATED DIFF  - D DIMER    2. Shortness of breath  See above    - XR CHEST PA LAT; Future  - D DIMER; Future  - CBC WITH AUTOMATED DIFF;  Future  - AMB POC BASIC METABOLIC PANEL  - CBC WITH AUTOMATED DIFF  - D DIMER    Rhythm: sinus rhythm  Rate: normal  QRS axis: normal  Conduction: incomplete RBBB  ST Segments: ST segments normal  Clinical impression: non-specific ECG  Clinical impression comment: borderline         Results for orders placed or performed in visit on 03/31/23   AMB POC COMPREHENSIVE METABOLIC PANEL   Result Value Ref Range    GLUCOSE 96 mg/dL    BUN 12 mg/dL    Creatinine (POC) 0.7 mg/dL    Sodium (POC) 141 MMOL/L    Potassium (POC) 3.8 MMOL/L    CHLORIDE 109 MMOL/L    CO2 22 mEq/L    CALCIUM 9.5 mg/dL    TOTAL PROTEIN 7.2 g/dL    ALBUMIN 3.8     AST (POC) 24 13 - 35 U/L    ALT (POC) 17 7 - 35 U/L    ALKALINE PHOS 80     TOTAL BILIRUBIN 0.5 mg/dL    eGFR (POC)

## 2023-05-04 PROBLEM — R07.89 OTHER CHEST PAIN: Status: ACTIVE | Noted: 2023-05-04

## 2023-05-04 PROBLEM — I45.10 INCOMPLETE RIGHT BUNDLE BRANCH BLOCK: Status: ACTIVE | Noted: 2023-05-04

## 2023-05-05 ENCOUNTER — OFFICE VISIT (OUTPATIENT)
Dept: CARDIOLOGY CLINIC | Age: 48
End: 2023-05-05

## 2023-05-05 VITALS
HEIGHT: 62 IN | RESPIRATION RATE: 18 BRPM | SYSTOLIC BLOOD PRESSURE: 118 MMHG | HEART RATE: 79 BPM | DIASTOLIC BLOOD PRESSURE: 82 MMHG | WEIGHT: 142 LBS | OXYGEN SATURATION: 99 % | BODY MASS INDEX: 26.13 KG/M2

## 2023-05-05 DIAGNOSIS — R06.02 SOB (SHORTNESS OF BREATH): ICD-10-CM

## 2023-05-05 DIAGNOSIS — R07.89 OTHER CHEST PAIN: Primary | ICD-10-CM

## 2023-05-05 DIAGNOSIS — R00.2 PALPITATIONS: ICD-10-CM

## 2023-05-17 ENCOUNTER — TELEPHONE (OUTPATIENT)
Dept: PRIMARY CARE CLINIC | Facility: CLINIC | Age: 48
End: 2023-05-17

## 2023-11-03 LAB — MAMMOGRAPHY, EXTERNAL: NORMAL

## 2023-12-11 SDOH — HEALTH STABILITY: PHYSICAL HEALTH: ON AVERAGE, HOW MANY DAYS PER WEEK DO YOU ENGAGE IN MODERATE TO STRENUOUS EXERCISE (LIKE A BRISK WALK)?: 2 DAYS

## 2023-12-11 SDOH — HEALTH STABILITY: PHYSICAL HEALTH: ON AVERAGE, HOW MANY MINUTES DO YOU ENGAGE IN EXERCISE AT THIS LEVEL?: 30 MIN

## 2023-12-13 ENCOUNTER — OFFICE VISIT (OUTPATIENT)
Age: 48
End: 2023-12-13
Payer: COMMERCIAL

## 2023-12-13 VITALS
OXYGEN SATURATION: 98 % | DIASTOLIC BLOOD PRESSURE: 83 MMHG | SYSTOLIC BLOOD PRESSURE: 134 MMHG | TEMPERATURE: 98.2 F | HEIGHT: 62 IN | BODY MASS INDEX: 26.31 KG/M2 | RESPIRATION RATE: 16 BRPM | HEART RATE: 88 BPM | WEIGHT: 143 LBS

## 2023-12-13 DIAGNOSIS — Z12.11 COLON CANCER SCREENING: ICD-10-CM

## 2023-12-13 DIAGNOSIS — G43.909 MIGRAINE WITHOUT STATUS MIGRAINOSUS, NOT INTRACTABLE, UNSPECIFIED MIGRAINE TYPE: ICD-10-CM

## 2023-12-13 DIAGNOSIS — Z76.89 ENCOUNTER TO ESTABLISH CARE: ICD-10-CM

## 2023-12-13 DIAGNOSIS — Z23 NEED FOR TETANUS BOOSTER: Primary | ICD-10-CM

## 2023-12-13 DIAGNOSIS — Z00.00 ADULT GENERAL MEDICAL EXAM: ICD-10-CM

## 2023-12-13 LAB
ALBUMIN SERPL-MCNC: 4.3 G/DL (ref 3.5–5)
ALBUMIN/GLOB SERPL: 1.3 (ref 1.1–2.2)
ALP SERPL-CCNC: 62 U/L (ref 45–117)
ALT SERPL-CCNC: 17 U/L (ref 12–78)
ANION GAP SERPL CALC-SCNC: 6 MMOL/L (ref 5–15)
AST SERPL-CCNC: 12 U/L (ref 15–37)
BILIRUB SERPL-MCNC: 0.5 MG/DL (ref 0.2–1)
BUN SERPL-MCNC: 10 MG/DL (ref 6–20)
BUN/CREAT SERPL: 14 (ref 12–20)
CALCIUM SERPL-MCNC: 9.3 MG/DL (ref 8.5–10.1)
CHLORIDE SERPL-SCNC: 105 MMOL/L (ref 97–108)
CHOLEST SERPL-MCNC: 249 MG/DL
CO2 SERPL-SCNC: 29 MMOL/L (ref 21–32)
CREAT SERPL-MCNC: 0.71 MG/DL (ref 0.55–1.02)
ERYTHROCYTE [DISTWIDTH] IN BLOOD BY AUTOMATED COUNT: 12.9 % (ref 11.5–14.5)
GLOBULIN SER CALC-MCNC: 3.3 G/DL (ref 2–4)
GLUCOSE SERPL-MCNC: 92 MG/DL (ref 65–100)
HCT VFR BLD AUTO: 47 % (ref 35–47)
HDLC SERPL-MCNC: 47 MG/DL
HDLC SERPL: 5.3 (ref 0–5)
HGB BLD-MCNC: 15.6 G/DL (ref 11.5–16)
LDLC SERPL CALC-MCNC: 173.8 MG/DL (ref 0–100)
MCH RBC QN AUTO: 31.7 PG (ref 26–34)
MCHC RBC AUTO-ENTMCNC: 33.2 G/DL (ref 30–36.5)
MCV RBC AUTO: 95.5 FL (ref 80–99)
NRBC # BLD: 0 K/UL (ref 0–0.01)
NRBC BLD-RTO: 0 PER 100 WBC
PLATELET # BLD AUTO: 331 K/UL (ref 150–400)
PMV BLD AUTO: 10.2 FL (ref 8.9–12.9)
POTASSIUM SERPL-SCNC: 4.4 MMOL/L (ref 3.5–5.1)
PROT SERPL-MCNC: 7.6 G/DL (ref 6.4–8.2)
RBC # BLD AUTO: 4.92 M/UL (ref 3.8–5.2)
SODIUM SERPL-SCNC: 140 MMOL/L (ref 136–145)
TRIGL SERPL-MCNC: 141 MG/DL
WBC # BLD AUTO: 6.7 K/UL (ref 3.6–11)

## 2023-12-13 PROCEDURE — 90471 IMMUNIZATION ADMIN: CPT | Performed by: STUDENT IN AN ORGANIZED HEALTH CARE EDUCATION/TRAINING PROGRAM

## 2023-12-13 PROCEDURE — 99203 OFFICE O/P NEW LOW 30 MIN: CPT | Performed by: STUDENT IN AN ORGANIZED HEALTH CARE EDUCATION/TRAINING PROGRAM

## 2023-12-13 PROCEDURE — 90715 TDAP VACCINE 7 YRS/> IM: CPT | Performed by: STUDENT IN AN ORGANIZED HEALTH CARE EDUCATION/TRAINING PROGRAM

## 2023-12-13 RX ORDER — LEVONORGESTREL AND ETHINYL ESTRADIOL 0.1-0.02MG
1 KIT ORAL DAILY
COMMUNITY

## 2023-12-13 RX ORDER — ZOLPIDEM TARTRATE 5 MG/1
5 TABLET ORAL NIGHTLY PRN
COMMUNITY

## 2023-12-13 RX ORDER — ATOGEPANT 60 MG/1
60 TABLET ORAL DAILY
COMMUNITY
Start: 2023-08-16

## 2023-12-13 SDOH — ECONOMIC STABILITY: HOUSING INSECURITY
IN THE LAST 12 MONTHS, WAS THERE A TIME WHEN YOU DID NOT HAVE A STEADY PLACE TO SLEEP OR SLEPT IN A SHELTER (INCLUDING NOW)?: NO

## 2023-12-13 SDOH — ECONOMIC STABILITY: INCOME INSECURITY: HOW HARD IS IT FOR YOU TO PAY FOR THE VERY BASICS LIKE FOOD, HOUSING, MEDICAL CARE, AND HEATING?: NOT HARD AT ALL

## 2023-12-13 SDOH — ECONOMIC STABILITY: FOOD INSECURITY: WITHIN THE PAST 12 MONTHS, YOU WORRIED THAT YOUR FOOD WOULD RUN OUT BEFORE YOU GOT MONEY TO BUY MORE.: NEVER TRUE

## 2023-12-13 SDOH — ECONOMIC STABILITY: FOOD INSECURITY: WITHIN THE PAST 12 MONTHS, THE FOOD YOU BOUGHT JUST DIDN'T LAST AND YOU DIDN'T HAVE MONEY TO GET MORE.: NEVER TRUE

## 2023-12-13 ASSESSMENT — ENCOUNTER SYMPTOMS
CHEST TIGHTNESS: 0
COLOR CHANGE: 0
SHORTNESS OF BREATH: 0
BACK PAIN: 0
EYE PAIN: 0
DIARRHEA: 0
ABDOMINAL PAIN: 0
COUGH: 0
NAUSEA: 0
WHEEZING: 0
RHINORRHEA: 0
VOMITING: 0
CONSTIPATION: 0

## 2023-12-13 NOTE — PROGRESS NOTES
Tricia Mujica is a 51y.o. year old female who is a new patient to me today (12/13/23). She was previously followed by Dr. Benji Peres:   1. Need for tetanus booster  -     Tdap, BOOSTRIX, (age 8 yrs+), IM  2. Colon cancer screening  -     Cologuard (Fecal DNA Colorectal Cancer Screening); Future  3. Adult general medical exam  -     Comprehensive Metabolic Panel; Future  -     CBC; Future  -     Lipid Panel; Future  4. Migraine without status migrainosus, not intractable, unspecified migraine type  Assessment & Plan:   Monitored by specialist- no acute findings meriting change in the plan  5. Encounter to establish care            Return in about 1 year (around 12/13/2024) for Annual Physical.      Subjective: Anne Quiroz was seen today for New Patient    Recently had chest pains/palpitations, was evaluated by cardiology and her chest pain was felt to be non-cardiac in nature. She also has history of migranes - sees neurology for this. She has struggled with insomnia for some time, has a prescription for Tanna Mort but tries not to use it very often. She has no acute concerns today, she is just here to get established and get back on track with preventive care. Care team:  Patient Care Team:  Alfred Pelaez DO as PCP - General (Internal Medicine)  Dr John Foster - neurology  Dr. Dede Taylor - cardiology    Health Maintenance:   Flu vaccine: up to date  COVID vaccine: up to date  Tetanus vaccine: update today  Shingles vaccine: up to date  Pneumonia vaccine: N/A  Cervical cancer screening: sees OBGYN, done in November  Breast cancer screening: up to date  Colon cancer screening: had colonoscopy in 20's for diarrhea, has not had since then, is due for routine screening    Review of Systems    Review of Systems   Constitutional:  Negative for appetite change, chills, fatigue, fever and unexpected weight change. HENT:  Negative for congestion, postnasal drip and rhinorrhea.     Eyes:  Negative for pain

## 2024-01-10 LAB — NONINV COLON CA DNA+OCC BLD SCRN STL QL: NEGATIVE

## 2024-09-24 ENCOUNTER — PATIENT MESSAGE (OUTPATIENT)
Age: 49
End: 2024-09-24

## 2024-12-23 ENCOUNTER — OFFICE VISIT (OUTPATIENT)
Age: 49
End: 2024-12-23

## 2024-12-23 VITALS
DIASTOLIC BLOOD PRESSURE: 84 MMHG | HEART RATE: 86 BPM | SYSTOLIC BLOOD PRESSURE: 129 MMHG | HEIGHT: 62 IN | BODY MASS INDEX: 27.23 KG/M2 | TEMPERATURE: 98 F | OXYGEN SATURATION: 98 % | RESPIRATION RATE: 16 BRPM | WEIGHT: 148 LBS

## 2024-12-23 DIAGNOSIS — Z00.00 ENCOUNTER FOR WELL ADULT EXAM WITHOUT ABNORMAL FINDINGS: Primary | ICD-10-CM

## 2024-12-23 DIAGNOSIS — E78.5 HYPERLIPIDEMIA, UNSPECIFIED HYPERLIPIDEMIA TYPE: ICD-10-CM

## 2024-12-23 DIAGNOSIS — R07.9 CHEST PAIN, UNSPECIFIED TYPE: ICD-10-CM

## 2024-12-23 RX ORDER — ESTRADIOL 0.05 MG/D
1 PATCH, EXTENDED RELEASE TRANSDERMAL
COMMUNITY
Start: 2024-11-06

## 2024-12-23 SDOH — ECONOMIC STABILITY: FOOD INSECURITY: WITHIN THE PAST 12 MONTHS, YOU WORRIED THAT YOUR FOOD WOULD RUN OUT BEFORE YOU GOT MONEY TO BUY MORE.: NEVER TRUE

## 2024-12-23 SDOH — ECONOMIC STABILITY: FOOD INSECURITY: WITHIN THE PAST 12 MONTHS, THE FOOD YOU BOUGHT JUST DIDN'T LAST AND YOU DIDN'T HAVE MONEY TO GET MORE.: NEVER TRUE

## 2024-12-23 SDOH — ECONOMIC STABILITY: INCOME INSECURITY: HOW HARD IS IT FOR YOU TO PAY FOR THE VERY BASICS LIKE FOOD, HOUSING, MEDICAL CARE, AND HEATING?: NOT HARD AT ALL

## 2024-12-23 ASSESSMENT — ENCOUNTER SYMPTOMS
COUGH: 0
DIARRHEA: 0
SHORTNESS OF BREATH: 0
NAUSEA: 0
ABDOMINAL PAIN: 0
VOMITING: 0
BLOOD IN STOOL: 0
CONSTIPATION: 0

## 2024-12-23 ASSESSMENT — PATIENT HEALTH QUESTIONNAIRE - PHQ9
SUM OF ALL RESPONSES TO PHQ QUESTIONS 1-9: 0
2. FEELING DOWN, DEPRESSED OR HOPELESS: NOT AT ALL
SUM OF ALL RESPONSES TO PHQ9 QUESTIONS 1 & 2: 0
SUM OF ALL RESPONSES TO PHQ QUESTIONS 1-9: 0
1. LITTLE INTEREST OR PLEASURE IN DOING THINGS: NOT AT ALL
SUM OF ALL RESPONSES TO PHQ QUESTIONS 1-9: 0
SUM OF ALL RESPONSES TO PHQ QUESTIONS 1-9: 0

## 2024-12-23 NOTE — PROGRESS NOTES
Well Adult Note  Name: Kayla Horton Today’s Date: 2024   MRN: [id number] Sex: Female   Age: 49 y.o. Ethnicity: Non- / Non    : 1975 Race: White (non-)      Kayla Horton is here for a well adult exam.          Assessment & Plan  Adult General Medical Exam  - See AVS/patient instructions for HM due. Reviewed with patient today  - She is UTD on recommended screenings and vaccines - does need shingles vaccine    2. Chest discomfort   - Symptoms could be related to GERD vs atypical angina  - Ordered EKG today which is WNL  - Advised to try OTC Pepcid/Prilosec to see if this relieves symptoms and to proceed to the ED if her symptoms intensify or return  - If she continues to have intermittent symptoms with no relief from H2 blocker/PPI would consider stress test    3. HLD  - Encouraged to continue with diet changes and update labs today  - Last ASCVD score 2.1%. If elevated or LDL > 190 will discuss starting statin         Return in 1 year (on 2025) for CPE (Physical Exam).     Subjective   History of Present Illness  The patient presents for an annual physical exam.    Fatigue and Perimenopause  Experiencing fatigue attributed to perimenopause.     Hyperlipidemia  Reports she has been working very hard on her diet since having labs done last year, hoping her cholesterol will be better    Stomach Upset and Chest Tightness  Reported feeling unwell on Thursday with stomach upset and chest tightness, initially dismissed as indigestion. Persistent chest pressure and frequent burping the following morning, continuing intermittently over the weekend with occasional nausea. Experienced left arm numbness on Saturday while driving, resolved spontaneously. Mild shortness of breath during chest tightness episodes, no severe shortness of breath. Tried simethicone for symptoms but it was ineffective.      Due for shingles vaccine this year.    Continues to take Ambien as needed for

## 2024-12-23 NOTE — PROGRESS NOTES
Verified name and birth date for privacy precautions.   Chart reviewed in preparation for today's visit.     Chief Complaint   Patient presents with    Annual Exam    Fatigue    Gas     Pressure in chest, nausea and excessive burping x 5 days          Health Maintenance Due   Topic    HIV screen     Hepatitis C screen     Hepatitis B vaccine (1 of 3 - 19+ 3-dose series)    Depression Screen     Shingles vaccine (1 of 2)         Wt Readings from Last 3 Encounters:   12/23/24 67.1 kg (148 lb)   12/13/23 64.9 kg (143 lb)   05/05/23 64.4 kg (142 lb)     Temp Readings from Last 3 Encounters:   12/23/24 98 °F (36.7 °C)   12/13/23 98.2 °F (36.8 °C) (Temporal)     BP Readings from Last 3 Encounters:   12/23/24 129/84   12/13/23 134/83   05/05/23 118/82     Pulse Readings from Last 3 Encounters:   12/23/24 86   12/13/23 88   05/05/23 79       Social Determinants of Health     Tobacco Use: Low Risk  (12/23/2024)    Patient History     Smoking Tobacco Use: Never     Smokeless Tobacco Use: Never     Passive Exposure: Not on file   Alcohol Use: Not on file   Financial Resource Strain: Low Risk  (12/23/2024)    Overall Financial Resource Strain (CARDIA)     Difficulty of Paying Living Expenses: Not hard at all   Food Insecurity: No Food Insecurity (12/23/2024)    Hunger Vital Sign     Worried About Running Out of Food in the Last Year: Never true     Ran Out of Food in the Last Year: Never true   Transportation Needs: Unknown (12/23/2024)    PRAPARE - Transportation     Lack of Transportation (Medical): Not on file     Lack of Transportation (Non-Medical): No   Physical Activity: Insufficiently Active (12/11/2023)    Exercise Vital Sign     Days of Exercise per Week: 2 days     Minutes of Exercise per Session: 30 min   Stress: Not on file   Social Connections: Not on file   Intimate Partner Violence: Not on file   Depression: Not at risk (12/23/2024)    PHQ-2     PHQ-2 Score: 0   Housing Stability: Unknown (12/23/2024)    Housing

## 2024-12-27 LAB
ALBUMIN SERPL-MCNC: 4.4 G/DL (ref 3.9–4.9)
ALP SERPL-CCNC: 84 IU/L (ref 44–121)
ALT SERPL-CCNC: 15 IU/L (ref 0–32)
AST SERPL-CCNC: 16 IU/L (ref 0–40)
BILIRUB SERPL-MCNC: 0.3 MG/DL (ref 0–1.2)
BUN SERPL-MCNC: 12 MG/DL (ref 6–24)
BUN/CREAT SERPL: 17 (ref 9–23)
CALCIUM SERPL-MCNC: 9.4 MG/DL (ref 8.7–10.2)
CHLORIDE SERPL-SCNC: 107 MMOL/L (ref 96–106)
CHOLEST SERPL-MCNC: 207 MG/DL (ref 100–199)
CO2 SERPL-SCNC: 21 MMOL/L (ref 20–29)
CREAT SERPL-MCNC: 0.71 MG/DL (ref 0.57–1)
EGFRCR SERPLBLD CKD-EPI 2021: 104 ML/MIN/1.73
ERYTHROCYTE [DISTWIDTH] IN BLOOD BY AUTOMATED COUNT: 12.4 % (ref 11.7–15.4)
GLOBULIN SER CALC-MCNC: 2.1 G/DL (ref 1.5–4.5)
GLUCOSE SERPL-MCNC: 86 MG/DL (ref 70–99)
HBA1C MFR BLD: 5.3 % (ref 4.8–5.6)
HCT VFR BLD AUTO: 44.3 % (ref 34–46.6)
HDLC SERPL-MCNC: 51 MG/DL
HGB BLD-MCNC: 15 G/DL (ref 11.1–15.9)
LDLC SERPL CALC-MCNC: 131 MG/DL (ref 0–99)
MAGNESIUM SERPL-MCNC: 2.3 MG/DL (ref 1.6–2.3)
MCH RBC QN AUTO: 32.5 PG (ref 26.6–33)
MCHC RBC AUTO-ENTMCNC: 33.9 G/DL (ref 31.5–35.7)
MCV RBC AUTO: 96 FL (ref 79–97)
PHOSPHATE SERPL-MCNC: 3.5 MG/DL (ref 3–4.3)
PLATELET # BLD AUTO: 311 X10E3/UL (ref 150–450)
POTASSIUM SERPL-SCNC: 4.3 MMOL/L (ref 3.5–5.2)
PROT SERPL-MCNC: 6.5 G/DL (ref 6–8.5)
RBC # BLD AUTO: 4.61 X10E6/UL (ref 3.77–5.28)
SODIUM SERPL-SCNC: 141 MMOL/L (ref 134–144)
T4 FREE SERPL-MCNC: 1.49 NG/DL (ref 0.82–1.77)
TRIGL SERPL-MCNC: 139 MG/DL (ref 0–149)
TSH SERPL DL<=0.005 MIU/L-ACNC: 0.97 UIU/ML (ref 0.45–4.5)
VLDLC SERPL CALC-MCNC: 25 MG/DL (ref 5–40)
WBC # BLD AUTO: 6.7 X10E3/UL (ref 3.4–10.8)

## 2025-04-02 ENCOUNTER — OFFICE VISIT (OUTPATIENT)
Age: 50
End: 2025-04-02

## 2025-04-02 VITALS
WEIGHT: 153 LBS | OXYGEN SATURATION: 99 % | HEART RATE: 87 BPM | DIASTOLIC BLOOD PRESSURE: 75 MMHG | RESPIRATION RATE: 19 BRPM | TEMPERATURE: 97.7 F | SYSTOLIC BLOOD PRESSURE: 107 MMHG | BODY MASS INDEX: 28.44 KG/M2

## 2025-04-02 DIAGNOSIS — K21.9 GASTROESOPHAGEAL REFLUX DISEASE, UNSPECIFIED WHETHER ESOPHAGITIS PRESENT: Primary | ICD-10-CM

## 2025-04-02 RX ORDER — OMEPRAZOLE 20 MG/1
20 CAPSULE, DELAYED RELEASE ORAL
Qty: 30 CAPSULE | Refills: 0 | Status: SHIPPED | OUTPATIENT
Start: 2025-04-02

## 2025-04-02 ASSESSMENT — ENCOUNTER SYMPTOMS
VOMITING: 0
CONSTIPATION: 1
SHORTNESS OF BREATH: 0
DIARRHEA: 0
NAUSEA: 0
ABDOMINAL PAIN: 1
COUGH: 0

## 2025-04-02 NOTE — PROGRESS NOTES
Subjective     Chief Complaint   Patient presents with    Abdominal Pain     Pt present with abdominal pain, bloating and constipation, trouble breathing with pain, nausea, gas, symptoms started 2 weeks ago.     Constipation         Abdominal Pain  Associated symptoms include constipation. Pertinent negatives include no diarrhea, fever, nausea or vomiting.   Constipation  Associated symptoms include abdominal pain. Pertinent negatives include no diarrhea, difficulty urinating, fever, nausea or vomiting.    49-year-old female presents for GERD.  Patient is having abdominal pain bloated occasional constipation gas started 2 weeks ago patient was seen by PCP a few months back and put on over-the-counter Prilosec which she has been taking she has also been using some stool softeners she is moving her bowels no urinary symptoms no fever chills nausea vomiting diarrhea non-smoker    Past Medical History:   Diagnosis Date    Chronic pain     Hx of mammogram 2013    Migraine     Pap smear for cervical cancer screening 2012       History reviewed. No pertinent surgical history.    Family History   Problem Relation Age of Onset    Lupus Sister     Mental Illness Sister     Cancer Brother         SCCA    Psychiatric Disorder Sister         multiple personalities    Lupus Sister     Hypertension Mother     Stroke Mother     Dementia Mother     Hearing Loss Mother     High Blood Pressure Mother     High Cholesterol Mother     Vision Loss Mother     Cancer Father         lung       No Known Allergies    Social History     Tobacco Use    Smoking status: Never    Smokeless tobacco: Never   Substance Use Topics    Alcohol use: Yes     Alcohol/week: 2.0 standard drinks of alcohol     Comment: Socially    Drug use: No       Vitals:    04/02/25 0827   BP: 107/75   Pulse: 87   Resp: 19   Temp: 97.7 °F (36.5 °C)   SpO2: 99%       Objective     Review of Systems   Constitutional:  Negative for chills and fever.   Respiratory:  Negative

## 2025-04-02 NOTE — PATIENT INSTRUCTIONS
Patient will be started on prescription strength Prilosec follow-up in 2 weeks if no improvement with PCP or GI for possible endoscopy

## 2025-04-11 ENCOUNTER — RESULTS FOLLOW-UP (OUTPATIENT)
Age: 50
End: 2025-04-11

## 2025-04-11 ENCOUNTER — OFFICE VISIT (OUTPATIENT)
Age: 50
End: 2025-04-11
Payer: COMMERCIAL

## 2025-04-11 ENCOUNTER — HOSPITAL ENCOUNTER (OUTPATIENT)
Facility: HOSPITAL | Age: 50
Discharge: HOME | End: 2025-04-14
Payer: COMMERCIAL

## 2025-04-11 VITALS
TEMPERATURE: 97.8 F | HEIGHT: 62 IN | DIASTOLIC BLOOD PRESSURE: 82 MMHG | BODY MASS INDEX: 27.79 KG/M2 | SYSTOLIC BLOOD PRESSURE: 115 MMHG | OXYGEN SATURATION: 100 % | RESPIRATION RATE: 16 BRPM | HEART RATE: 97 BPM | WEIGHT: 151 LBS

## 2025-04-11 DIAGNOSIS — R10.13 EPIGASTRIC PAIN: ICD-10-CM

## 2025-04-11 DIAGNOSIS — R10.30 LOWER ABDOMINAL PAIN: Primary | ICD-10-CM

## 2025-04-11 DIAGNOSIS — R10.30 LOWER ABDOMINAL PAIN: ICD-10-CM

## 2025-04-11 DIAGNOSIS — R19.5 MUCUS IN STOOL: ICD-10-CM

## 2025-04-11 LAB
ALBUMIN SERPL-MCNC: 4.3 G/DL (ref 3.5–5)
ALBUMIN/GLOB SERPL: 1.3 (ref 1.1–2.2)
ALP SERPL-CCNC: 81 U/L (ref 45–117)
ALT SERPL-CCNC: 32 U/L (ref 12–78)
ANION GAP SERPL CALC-SCNC: 5 MMOL/L (ref 2–12)
APPEARANCE UR: CLEAR
AST SERPL-CCNC: 17 U/L (ref 15–37)
BACTERIA URNS QL MICRO: NEGATIVE /HPF
BASOPHILS # BLD: 0.05 K/UL (ref 0–0.1)
BASOPHILS NFR BLD: 0.8 % (ref 0–1)
BILIRUB SERPL-MCNC: 0.5 MG/DL (ref 0.2–1)
BILIRUB UR QL: NEGATIVE
BUN SERPL-MCNC: 11 MG/DL (ref 6–20)
BUN/CREAT SERPL: 14 (ref 12–20)
CALCIUM SERPL-MCNC: 9.7 MG/DL (ref 8.5–10.1)
CHLORIDE SERPL-SCNC: 109 MMOL/L (ref 97–108)
CO2 SERPL-SCNC: 24 MMOL/L (ref 21–32)
COLOR UR: NORMAL
CREAT SERPL-MCNC: 0.79 MG/DL (ref 0.55–1.02)
DIFFERENTIAL METHOD BLD: NORMAL
EOSINOPHIL # BLD: 0.12 K/UL (ref 0–0.4)
EOSINOPHIL NFR BLD: 2 % (ref 0–7)
EPITH CASTS URNS QL MICRO: NORMAL /LPF
ERYTHROCYTE [DISTWIDTH] IN BLOOD BY AUTOMATED COUNT: 13.2 % (ref 11.5–14.5)
GLOBULIN SER CALC-MCNC: 3.2 G/DL (ref 2–4)
GLUCOSE SERPL-MCNC: 88 MG/DL (ref 65–100)
GLUCOSE UR STRIP.AUTO-MCNC: NEGATIVE MG/DL
HCT VFR BLD AUTO: 43.6 % (ref 35–47)
HGB BLD-MCNC: 14.6 G/DL (ref 11.5–16)
HGB UR QL STRIP: NEGATIVE
IMM GRANULOCYTES # BLD AUTO: 0.01 K/UL (ref 0–0.04)
IMM GRANULOCYTES NFR BLD AUTO: 0.2 % (ref 0–0.5)
KETONES UR QL STRIP.AUTO: NEGATIVE MG/DL
LEUKOCYTE ESTERASE UR QL STRIP.AUTO: NEGATIVE
LIPASE SERPL-CCNC: 54 U/L (ref 13–75)
LYMPHOCYTES # BLD: 1.93 K/UL (ref 0.8–3.5)
LYMPHOCYTES NFR BLD: 32.3 % (ref 12–49)
MCH RBC QN AUTO: 32 PG (ref 26–34)
MCHC RBC AUTO-ENTMCNC: 33.5 G/DL (ref 30–36.5)
MCV RBC AUTO: 95.6 FL (ref 80–99)
MONOCYTES # BLD: 0.6 K/UL (ref 0–1)
MONOCYTES NFR BLD: 10 % (ref 5–13)
NEUTS SEG # BLD: 3.27 K/UL (ref 1.8–8)
NEUTS SEG NFR BLD: 54.7 % (ref 32–75)
NITRITE UR QL STRIP.AUTO: NEGATIVE
NRBC # BLD: 0 K/UL (ref 0–0.01)
NRBC BLD-RTO: 0 PER 100 WBC
PH UR STRIP: 6.5 (ref 5–8)
PLATELET # BLD AUTO: 312 K/UL (ref 150–400)
PMV BLD AUTO: 10 FL (ref 8.9–12.9)
POTASSIUM SERPL-SCNC: 4.2 MMOL/L (ref 3.5–5.1)
PROT SERPL-MCNC: 7.5 G/DL (ref 6.4–8.2)
PROT UR STRIP-MCNC: NEGATIVE MG/DL
RBC # BLD AUTO: 4.56 M/UL (ref 3.8–5.2)
RBC #/AREA URNS HPF: NORMAL /HPF (ref 0–5)
SODIUM SERPL-SCNC: 138 MMOL/L (ref 136–145)
SP GR UR REFRACTOMETRY: 1.02 (ref 1–1.03)
URINE CULTURE IF INDICATED: NORMAL
UROBILINOGEN UR QL STRIP.AUTO: 0.2 EU/DL (ref 0.2–1)
WBC # BLD AUTO: 6 K/UL (ref 3.6–11)
WBC URNS QL MICRO: NORMAL /HPF (ref 0–4)

## 2025-04-11 PROCEDURE — 74177 CT ABD & PELVIS W/CONTRAST: CPT

## 2025-04-11 PROCEDURE — 99214 OFFICE O/P EST MOD 30 MIN: CPT | Performed by: NURSE PRACTITIONER

## 2025-04-11 PROCEDURE — 6360000004 HC RX CONTRAST MEDICATION: Performed by: NURSE PRACTITIONER

## 2025-04-11 RX ORDER — TOPIRAMATE 100 MG/1
100 TABLET, FILM COATED ORAL 2 TIMES DAILY
COMMUNITY

## 2025-04-11 RX ORDER — IOPAMIDOL 755 MG/ML
100 INJECTION, SOLUTION INTRAVASCULAR
Status: COMPLETED | OUTPATIENT
Start: 2025-04-11 | End: 2025-04-11

## 2025-04-11 RX ORDER — TOPIRAMATE 100 MG/1
100 TABLET, FILM COATED ORAL DAILY
COMMUNITY
Start: 2025-02-04 | End: 2025-03-06

## 2025-04-11 RX ADMIN — IOPAMIDOL 100 ML: 755 INJECTION, SOLUTION INTRAVENOUS at 14:31

## 2025-04-11 ASSESSMENT — ENCOUNTER SYMPTOMS
DIARRHEA: 1
ABDOMINAL PAIN: 1

## 2025-04-11 ASSESSMENT — PATIENT HEALTH QUESTIONNAIRE - PHQ9
SUM OF ALL RESPONSES TO PHQ QUESTIONS 1-9: 0
SUM OF ALL RESPONSES TO PHQ QUESTIONS 1-9: 0
1. LITTLE INTEREST OR PLEASURE IN DOING THINGS: NOT AT ALL
2. FEELING DOWN, DEPRESSED OR HOPELESS: NOT AT ALL
SUM OF ALL RESPONSES TO PHQ QUESTIONS 1-9: 0
SUM OF ALL RESPONSES TO PHQ QUESTIONS 1-9: 0

## 2025-04-11 NOTE — PROGRESS NOTES
Future  2. Epigastric pain  -     CT ABDOMEN PELVIS W IV CONTRAST Additional Contrast? Radiologist Recommendation; Future  -     CBC with Auto Differential; Future  -     Comprehensive Metabolic Panel; Future  -     Lipase; Future  3. Mucus in stool    Labs and CT  Plan will depend on result  --Igor Stanton APRN - NP

## 2025-04-15 ENCOUNTER — RESULTS FOLLOW-UP (OUTPATIENT)
Age: 50
End: 2025-04-15

## 2025-04-28 RX ORDER — OMEPRAZOLE 20 MG/1
20 CAPSULE, DELAYED RELEASE ORAL
Qty: 90 CAPSULE | Refills: 1 | OUTPATIENT
Start: 2025-04-28